# Patient Record
Sex: MALE | Race: OTHER | HISPANIC OR LATINO | URBAN - METROPOLITAN AREA
[De-identification: names, ages, dates, MRNs, and addresses within clinical notes are randomized per-mention and may not be internally consistent; named-entity substitution may affect disease eponyms.]

---

## 2017-06-22 ENCOUNTER — EMERGENCY (EMERGENCY)
Facility: HOSPITAL | Age: 27
LOS: 0 days | Discharge: HOME | End: 2017-06-22

## 2017-06-22 DIAGNOSIS — S81.012A LACERATION WITHOUT FOREIGN BODY, LEFT KNEE, INITIAL ENCOUNTER: ICD-10-CM
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
## 2017-06-22 DIAGNOSIS — Z23 ENCOUNTER FOR IMMUNIZATION: ICD-10-CM
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
## 2017-06-26 ENCOUNTER — EMERGENCY (EMERGENCY)
Facility: HOSPITAL | Age: 27
LOS: 0 days | Discharge: HOME | End: 2017-06-26

## 2017-06-26 DIAGNOSIS — S81.011D LACERATION WITHOUT FOREIGN BODY, RIGHT KNEE, SUBSEQUENT ENCOUNTER: ICD-10-CM
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
## 2017-07-06 ENCOUNTER — EMERGENCY (EMERGENCY)
Facility: HOSPITAL | Age: 27
LOS: 0 days | Discharge: HOME | End: 2017-07-06
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
## 2017-07-06 DIAGNOSIS — S81.012D LACERATION WITHOUT FOREIGN BODY, LEFT KNEE, SUBSEQUENT ENCOUNTER: ICD-10-CM

## 2017-08-07 ENCOUNTER — OUTPATIENT (OUTPATIENT)
Dept: OUTPATIENT SERVICES | Facility: HOSPITAL | Age: 27
LOS: 1 days | Discharge: HOME | End: 2017-08-07

## 2017-08-07 DIAGNOSIS — Z01.21 ENCOUNTER FOR DENTAL EXAMINATION AND CLEANING WITH ABNORMAL FINDINGS: ICD-10-CM

## 2017-10-02 ENCOUNTER — OUTPATIENT (OUTPATIENT)
Dept: OUTPATIENT SERVICES | Facility: HOSPITAL | Age: 27
LOS: 1 days | Discharge: HOME | End: 2017-10-02

## 2017-10-16 ENCOUNTER — OUTPATIENT (OUTPATIENT)
Dept: OUTPATIENT SERVICES | Facility: HOSPITAL | Age: 27
LOS: 1 days | Discharge: HOME | End: 2017-10-16

## 2017-10-27 ENCOUNTER — OUTPATIENT (OUTPATIENT)
Dept: OUTPATIENT SERVICES | Facility: HOSPITAL | Age: 27
LOS: 1 days | Discharge: HOME | End: 2017-10-27

## 2017-11-03 ENCOUNTER — OUTPATIENT (OUTPATIENT)
Dept: OUTPATIENT SERVICES | Facility: HOSPITAL | Age: 27
LOS: 1 days | Discharge: HOME | End: 2017-11-03

## 2017-11-08 ENCOUNTER — OUTPATIENT (OUTPATIENT)
Dept: OUTPATIENT SERVICES | Facility: HOSPITAL | Age: 27
LOS: 1 days | Discharge: HOME | End: 2017-11-08

## 2017-11-10 ENCOUNTER — OUTPATIENT (OUTPATIENT)
Dept: OUTPATIENT SERVICES | Facility: HOSPITAL | Age: 27
LOS: 1 days | Discharge: HOME | End: 2017-11-10

## 2017-11-17 ENCOUNTER — OUTPATIENT (OUTPATIENT)
Dept: OUTPATIENT SERVICES | Facility: HOSPITAL | Age: 27
LOS: 1 days | Discharge: HOME | End: 2017-11-17

## 2017-12-04 ENCOUNTER — OUTPATIENT (OUTPATIENT)
Dept: OUTPATIENT SERVICES | Facility: HOSPITAL | Age: 27
LOS: 1 days | Discharge: HOME | End: 2017-12-04

## 2017-12-04 DIAGNOSIS — K05.6 PERIODONTAL DISEASE, UNSPECIFIED: ICD-10-CM

## 2018-02-09 ENCOUNTER — OUTPATIENT (OUTPATIENT)
Dept: OUTPATIENT SERVICES | Facility: HOSPITAL | Age: 28
LOS: 1 days | Discharge: HOME | End: 2018-02-09

## 2018-02-12 DIAGNOSIS — K02.52 DENTAL CARIES ON PIT AND FISSURE SURFACE PENETRATING INTO DENTIN: ICD-10-CM

## 2018-02-16 ENCOUNTER — OUTPATIENT (OUTPATIENT)
Dept: OUTPATIENT SERVICES | Facility: HOSPITAL | Age: 28
LOS: 1 days | Discharge: HOME | End: 2018-02-16

## 2018-02-23 ENCOUNTER — OUTPATIENT (OUTPATIENT)
Dept: OUTPATIENT SERVICES | Facility: HOSPITAL | Age: 28
LOS: 1 days | Discharge: HOME | End: 2018-02-23

## 2018-02-26 DIAGNOSIS — K02.9 DENTAL CARIES, UNSPECIFIED: ICD-10-CM

## 2018-12-01 ENCOUNTER — EMERGENCY (EMERGENCY)
Facility: HOSPITAL | Age: 28
LOS: 0 days | Discharge: HOME | End: 2018-12-01
Attending: EMERGENCY MEDICINE | Admitting: EMERGENCY MEDICINE

## 2018-12-01 VITALS
HEART RATE: 109 BPM | TEMPERATURE: 98 F | DIASTOLIC BLOOD PRESSURE: 74 MMHG | OXYGEN SATURATION: 98 % | RESPIRATION RATE: 18 BRPM | SYSTOLIC BLOOD PRESSURE: 155 MMHG

## 2018-12-01 VITALS — WEIGHT: 264.33 LBS

## 2018-12-01 DIAGNOSIS — R20.0 ANESTHESIA OF SKIN: ICD-10-CM

## 2018-12-01 DIAGNOSIS — G51.0 BELL'S PALSY: ICD-10-CM

## 2018-12-01 DIAGNOSIS — Z79.899 OTHER LONG TERM (CURRENT) DRUG THERAPY: ICD-10-CM

## 2018-12-01 DIAGNOSIS — Z88.0 ALLERGY STATUS TO PENICILLIN: ICD-10-CM

## 2018-12-01 RX ORDER — VALACYCLOVIR 500 MG/1
2 TABLET, FILM COATED ORAL
Qty: 42 | Refills: 0 | OUTPATIENT
Start: 2018-12-01 | End: 2018-12-07

## 2018-12-01 NOTE — ED PROVIDER NOTE - MEDICAL DECISION MAKING DETAILS
Chart finished.  I personally evaluated the patient. I reviewed the Resident’s or Physician Assistant’s note (as assigned above), and agree with the findings and plan except as documented in my note.  27 yo male with no significant PMHx presents for facial weakness on right side x 2 days. Patient states he woke up yesterday with right sided facial paralysis. Patient states he has never had this before. Patient/family denies fevers, chest pain, SOB, abdominal pain, nausea, vomiting, diarrhea, dizziness, weakness, changes in PO intake, changes in urine output, changes in mental status.   Patient with classic signs of Bell's palsy.  No zoster present.  Corticosteroids, lacrilube, artifical tears and eye patch in evening and outpatient follow up.

## 2018-12-01 NOTE — ED PROVIDER NOTE - NSFOLLOWUPINSTRUCTIONS_ED_ALL_ED_FT
Follow up with medicine clinic in 48 hours for re-evaluation and further treatment.    Meehan's Palsy    Bell's palsy is a condition in which the muscles on one side of the face become paralyzed. This often causes one side of the face to droop. It is a common condition and most people recover completely. Causes include viral infections but most of the time the reason remains unknown. Signs and symptoms include not being able to raise your eyebrow, not being able to close your eye, drooping of the eyelid and corner of the mouth, sensitivity to loud noises, dryness of the eye, change in taste, and not being able to close your mouth/drooling. Take medicines only as directed by your health care provider. If your eye is affected use moisturizing eye drops to prevent drying of your eye and tape your eyelid shut at night as directed by your health care provider.    SEEK IMMEDIATE MEDICAL CARE IF YOU HAVE THE FOLLOWING SYMPTOMS: weakness or numbness in another part of your body, difficulty swallowing, fever, or neck pain.

## 2018-12-01 NOTE — ED PROVIDER NOTE - ATTENDING CONTRIBUTION TO CARE
I personally evaluated the patient. I reviewed the Resident’s or Physician Assistant’s note (as assigned above), and agree with the findings and plan except as documented in my note.  29 yo male with no significant PMHx presents for facial weakness on right side x 2 days. Patient states he woke up yesterday with right sided facial paralysis. Patient states he has never had this before. Patient/family denies fevers, chest pain, SOB, abdominal pain, nausea, vomiting, diarrhea, dizziness, weakness, changes in PO intake, changes in urine output, changes in mental status.   Patient with classic signs of Bell's palsy.  No zoster present.  Corticosteroids, lacrilube, artifical tears and eye patch in evening and outpatient follow up.

## 2018-12-01 NOTE — ED PROVIDER NOTE - OBJECTIVE STATEMENT
27 yo male with no significant PMHx presents for facial weakness on right side x 2 days. Patient states he woke up yesterday with right sided facial paralysis. Patient states he has never had this before. Patient/family denies fevers, chest pain, SOB, abdominal pain, nausea, vomiting, diarrhea, dizziness, weakness, changes in PO intake, changes in urine output, changes in mental status.

## 2018-12-01 NOTE — ED PROVIDER NOTE - PHYSICAL EXAMINATION
GEN: Well appearing, in no apparent distress.    HEAD:  Normocephalic, atraumatic.    EYES:  Clear conjunctivae without injection, drainage or discharge.    ENMT:  Moist MM.    NECK:  Supple, no masses. Normal ROM.    CARDIAC:  RRR, normal S1 and S2, no murmurs, rubs or gallops.    RESP:  Respiratory rate and effort appear normal; lungs are clear to auscultation bilaterally; no rhonchi, rales or wheezes.    ABDOMEN:  Soft, non-tender, non-distended, no masses. Normal BS throughout.    MUSCULOSKELETAL: No leg swelling, no calf tenderness. Patient able to ambulate without difficulty.  NEURO:  AAO x 3. Motor 5/5. Sensory intact. right sided facial paralysis including forehead on right side.     SKIN:  Normal skin color for age and race, well-perfused; warm and dry.

## 2018-12-01 NOTE — ED PROVIDER NOTE - NSFOLLOWUPCLINICS_GEN_ALL_ED_FT
[facility]  Medicine  [address], [place]   Phone: [phone]  Fax:   Follow Up Time: 1-3 Days

## 2018-12-05 ENCOUNTER — APPOINTMENT (OUTPATIENT)
Dept: INTERNAL MEDICINE | Facility: CLINIC | Age: 28
End: 2018-12-05

## 2018-12-05 ENCOUNTER — OUTPATIENT (OUTPATIENT)
Dept: OUTPATIENT SERVICES | Facility: HOSPITAL | Age: 28
LOS: 1 days | Discharge: HOME | End: 2018-12-05

## 2018-12-05 VITALS
WEIGHT: 259 LBS | SYSTOLIC BLOOD PRESSURE: 132 MMHG | HEIGHT: 69.5 IN | HEART RATE: 63 BPM | BODY MASS INDEX: 37.5 KG/M2 | DIASTOLIC BLOOD PRESSURE: 87 MMHG

## 2018-12-05 DIAGNOSIS — G51.0 BELL'S PALSY: ICD-10-CM

## 2018-12-05 DIAGNOSIS — E66.9 OBESITY, UNSPECIFIED: ICD-10-CM

## 2018-12-05 DIAGNOSIS — Z00.00 ENCOUNTER FOR GENERAL ADULT MEDICAL EXAMINATION W/OUT ABNORMAL FINDINGS: ICD-10-CM

## 2018-12-05 DIAGNOSIS — Z83.3 FAMILY HISTORY OF DIABETES MELLITUS: ICD-10-CM

## 2018-12-05 RX ORDER — DEXTRAN 70/HYPROMELLOSE 0.1%-0.3%
0.1-0.3 DROPS OPHTHALMIC (EYE) EVERY 4 HOURS
Qty: 2 | Refills: 1 | Status: ACTIVE | COMMUNITY
Start: 2018-12-05 | End: 1900-01-01

## 2018-12-05 NOTE — REVIEW OF SYSTEMS
[Fever] : no fever [Discharge] : no discharge [Vision Problems] : no vision problems [Sore Throat] : no sore throat [Chest Pain] : no chest pain [Palpitations] : no palpitations [Lower Ext Edema] : no lower extremity edema [Orthopena] : no orthopnea [Shortness Of Breath] : no shortness of breath [Wheezing] : no wheezing [Dyspnea on Exertion] : not dyspnea on exertion [Abdominal Pain] : no abdominal pain [Constipation] : no constipation [Vomiting] : no vomiting [Joint Pain] : no joint pain [Joint Stiffness] : no joint stiffness [Suicidal] : not suicidal [Anxiety] : no anxiety [de-identified] : right facial asymmetry; right eye unalbe to close

## 2018-12-05 NOTE — HISTORY OF PRESENT ILLNESS
[de-identified] : 28 yom with no significant pmh presents after an ED visit; pt was seen in ED and was diagnosed with bells palsy and given prednisone 60mg for 7 days; pt still has facial asymetry;

## 2018-12-05 NOTE — ASSESSMENT
[FreeTextEntry1] : 28 yom with no significant pmh presents after an ED visit; pt was seen in ED and was diagnosed with bells palsy and given prednisone 60mg for 7 days; pt still has facial asymetry;\par \par # Celestine palsy- right sided\par - neuro cs\par - complete 1 week prednisone\par - exercise advised- making bubble- eating chewing gum;\par - artificial tears to right eye\par - close eye with tape while sleeping;\par \par # obesity,complicated family h/o for DM\par - nurtition cs\par - check HgA1C, lipid profile\par \par # hcm\par - will give vaccines next visit\par - routine labs

## 2018-12-06 DIAGNOSIS — G51.0 BELL'S PALSY: ICD-10-CM

## 2018-12-06 DIAGNOSIS — E66.9 OBESITY, UNSPECIFIED: ICD-10-CM

## 2018-12-06 DIAGNOSIS — Z83.3 FAMILY HISTORY OF DIABETES MELLITUS: ICD-10-CM

## 2018-12-17 ENCOUNTER — APPOINTMENT (OUTPATIENT)
Dept: NUTRITION | Facility: CLINIC | Age: 28
End: 2018-12-17

## 2019-03-19 ENCOUNTER — APPOINTMENT (OUTPATIENT)
Dept: NEUROLOGY | Facility: CLINIC | Age: 29
End: 2019-03-19

## 2019-03-21 ENCOUNTER — APPOINTMENT (OUTPATIENT)
Dept: INTERNAL MEDICINE | Facility: CLINIC | Age: 29
End: 2019-03-21

## 2020-06-20 NOTE — ED PROVIDER NOTE - NS ED ROS FT
admission
Constitutional: No fevers/chills.    Head: No injury, headache.    Eyes:  No visual changes, eye pain or discharge. No injury.    ENMT:  No hearing changes, pain, discharge or infections. No neck pain or stiffness. No loss ROM.    Cardiac:  No chest pain, SOB or edema. No chest pain with exertion.    Respiratory:  No cough or respiratory distress.     GI:  No nausea, vomiting, diarrhea or abdominal pain. No anorexia. No change in PO intake.    :  No frequency, urgency or burning. No change in urine output.    MS:  No leg pain, leg swelling, myalgia, muscle weakness, joint pain or back pain. No loss ROM.    Neuro:  No dizziness or weakness.  No LOC.    Skin:  No skin rash.

## 2020-09-22 ENCOUNTER — EMERGENCY (EMERGENCY)
Facility: HOSPITAL | Age: 30
LOS: 0 days | Discharge: HOME | End: 2020-09-22
Attending: EMERGENCY MEDICINE | Admitting: EMERGENCY MEDICINE
Payer: SELF-PAY

## 2020-09-22 VITALS
RESPIRATION RATE: 18 BRPM | DIASTOLIC BLOOD PRESSURE: 75 MMHG | OXYGEN SATURATION: 99 % | TEMPERATURE: 98 F | HEART RATE: 78 BPM | SYSTOLIC BLOOD PRESSURE: 132 MMHG

## 2020-09-22 DIAGNOSIS — R20.0 ANESTHESIA OF SKIN: ICD-10-CM

## 2020-09-22 DIAGNOSIS — Z88.0 ALLERGY STATUS TO PENICILLIN: ICD-10-CM

## 2020-09-22 DIAGNOSIS — G51.0 BELL'S PALSY: ICD-10-CM

## 2020-09-22 PROCEDURE — 99284 EMERGENCY DEPT VISIT MOD MDM: CPT

## 2020-09-22 RX ORDER — ACYCLOVIR SODIUM 500 MG
1 VIAL (EA) INTRAVENOUS
Qty: 40 | Refills: 0
Start: 2020-09-22 | End: 2020-10-01

## 2020-09-22 NOTE — ED PROVIDER NOTE - ATTENDING CONTRIBUTION TO CARE
30M PMH R bells palsy 2 yrs ago, p/w 2 days of L sided facial droop. feels exactly like when he had bells on R side. sat felt numb on L face, sunday was unable to move L side. no fever, cough, uri sx. no ear pain. no ha, extremity numbness/weakness. eye is tearing. no cp, sob. no recent trauma or blood thinners. nonsmoker.    on exam, AFVSS, well christina nad, ncat, eomi, perrla, mmm, lctab, rrr nl s1s2 no mrg, abd soft ntnd, aaox3,  Left sided facial droop, unable to raise L eyebrow or close L eye fully, No nystagmus.  5/5 motor x 4 ext, SILT x 4 extremities, No slurred speech. No pronator drift.  Normal rapid alternating movement and finger nose finger bilaterally. No midline C/T/L tenderness to palpation or step off. Normal gait, No ataxia. , no le edema or calf ttp,     a/p; Suspect bells palsy, no risk factors for CVA, pt is nv intact, normal BP. will dc home w artificial tears, prednisone and valtrex, f/u neuro 1-2 weeks, strict return precautions provided.

## 2020-09-22 NOTE — ED PROVIDER NOTE - OBJECTIVE STATEMENT
30 year old male, past medical history bell's palsy (2018), who presents with l-sided facial droop that began x2 days ago. No fever, chills, vision changes, slurred speech, URI symptoms, CP, SOB, abd pain, n/v, skin changes. Patient tolerating po. No recent falls, trauma, travel.

## 2020-09-22 NOTE — ED PROVIDER NOTE - NSFOLLOWUPINSTRUCTIONS_ED_ALL_ED_FT
Please follow up with [facility] and Neurology in 1-3 days   Please be aware of any new or worsening signs or symptoms that should prompt your return to the ER.      Meehan's Palsy    Bell's palsy is a condition in which the muscles on one side of the face become paralyzed. This often causes one side of the face to droop. It is a common condition and most people recover completely. Causes include viral infections but most of the time the reason remains unknown. Signs and symptoms include not being able to raise your eyebrow, not being able to close your eye, drooping of the eyelid and corner of the mouth, sensitivity to loud noises, dryness of the eye, change in taste, and not being able to close your mouth/drooling. Take medicines only as directed by your health care provider. If your eye is affected use moisturizing eye drops to prevent drying of your eye and tape your eyelid shut at night as directed by your health care provider.    SEEK IMMEDIATE MEDICAL CARE IF YOU HAVE THE FOLLOWING SYMPTOMS: weakness or numbness in another part of your body, difficulty swallowing, fever, or neck pain.

## 2020-09-22 NOTE — ED PROVIDER NOTE - PROGRESS NOTE DETAILS
patient educated on s/s to be aware of that should prompt return to the er. patient verbalizes understanding and will fu with pmd and neurology. patient verbalizes understanding of plan. sent rx to pharmacy.

## 2020-09-22 NOTE — ED ADULT NURSE NOTE - OBJECTIVE STATEMENT
patient reports that he has had left side facial numbness since Sunday.  Patient denies chest pain, n/v, fever. and no SOB. Patient noted to have asymmetrical smile and left eye more difficult to closes.  Patient A&Ox4, ambulates with steady gait.  Patient denies pain or injury.

## 2020-09-22 NOTE — ED PROVIDER NOTE - PHYSICAL EXAMINATION
CONSTITUTIONAL: Well-developed; well-nourished; in no acute distress, nontoxic appearing  SKIN: skin exam is warm and dry,  HEAD: Normocephalic; atraumatic.  EYES: PERRL, EOM intact; conjunctiva and sclera clear.  ENT: MMM, oropharynx non-erythematous, uvula midline, no stridor   NECK: ROM intact.  CARD: S1, S2 normal, no murmur  RESP: No wheezes, rales or rhonchi. Good air movement bilaterally  EXT: Normal ROM.   NEURO: awake, alert, following commands, oriented, grossly unremarkable. No Focal deficits. GCS 15. +L-sided facial droop involving eyebrow/lip. no slurred speech.    PSYCH: Cooperative, appropriate.

## 2020-09-22 NOTE — ED ADULT NURSE NOTE - CHPI ED NUR SYMPTOMS NEG
no fever/no nausea/no chills/no vomiting/no pain/no tingling/no decreased eating/drinking/no dizziness

## 2020-09-22 NOTE — ED PROVIDER NOTE - NS ED ROS FT
Review of Systems:  	•	CONSTITUTIONAL: no fever, no diaphoresis, no chills  	•	SKIN: no rash  	•	EYES: no eye pain, no blurry vision  	•	ENT: no change in hearing, no tinnitus, no sore throat, no difficulty swallowing   	•	RESPIRATORY: no shortness of breath, no cough  	•	CARDIAC: no chest pain, no palpitations  	•	GI: no nausea, no vomiting   	•	MUSCULOSKELETAL: no joint paint, no swelling, no redness  	•	NEUROLOGIC: +L-sided facial weakness/droop. no headache, no paresthesias

## 2020-09-22 NOTE — ED PROVIDER NOTE - CLINICAL SUMMARY MEDICAL DECISION MAKING FREE TEXT BOX
a/p; Suspect bells palsy, no risk factors for CVA, pt is nv intact, normal BP. will dc home w artificial tears, prednisone and valtrex, f/u neuro 1-2 weeks, strict return precautions provided.

## 2020-09-22 NOTE — ED PROVIDER NOTE - PATIENT PORTAL LINK FT
You can access the FollowMyHealth Patient Portal offered by [facility] by registering at the following website: [email or web address]. By joining [employer]’s FollowMyHealth portal, you will also be able to view your health information using other applications (apps) compatible with our system.

## 2020-09-22 NOTE — ED PROVIDER NOTE - NSFOLLOWUPCLINICS_GEN_ALL_ED_FT
[facility]  Medicine  [address], [place]   Phone: [phone]  Fax:   Follow Up Time: 1-3 Days    [facility]  Neurology  [address] [place] [id number]  Phone: [phone]  Fax:   Follow Up Time: 1-3 Days